# Patient Record
Sex: FEMALE | Race: WHITE | NOT HISPANIC OR LATINO | Employment: OTHER | URBAN - METROPOLITAN AREA
[De-identification: names, ages, dates, MRNs, and addresses within clinical notes are randomized per-mention and may not be internally consistent; named-entity substitution may affect disease eponyms.]

---

## 2017-02-20 ENCOUNTER — GENERIC CONVERSION - ENCOUNTER (OUTPATIENT)
Dept: OTHER | Facility: OTHER | Age: 67
End: 2017-02-20

## 2017-04-02 ENCOUNTER — ANESTHESIA EVENT (OUTPATIENT)
Dept: PERIOP | Facility: HOSPITAL | Age: 67
End: 2017-04-02
Payer: MEDICARE

## 2017-04-03 ENCOUNTER — ANESTHESIA (OUTPATIENT)
Dept: PERIOP | Facility: HOSPITAL | Age: 67
End: 2017-04-03
Payer: MEDICARE

## 2017-04-03 ENCOUNTER — GENERIC CONVERSION - ENCOUNTER (OUTPATIENT)
Dept: OTHER | Facility: OTHER | Age: 67
End: 2017-04-03

## 2017-04-03 ENCOUNTER — HOSPITAL ENCOUNTER (OUTPATIENT)
Facility: HOSPITAL | Age: 67
Setting detail: OUTPATIENT SURGERY
Discharge: HOME | End: 2017-04-03
Attending: INTERNAL MEDICINE | Admitting: INTERNAL MEDICINE
Payer: MEDICARE

## 2017-04-03 VITALS
DIASTOLIC BLOOD PRESSURE: 64 MMHG | BODY MASS INDEX: 17.14 KG/M2 | OXYGEN SATURATION: 99 % | WEIGHT: 85 LBS | TEMPERATURE: 97.1 F | HEIGHT: 59 IN | SYSTOLIC BLOOD PRESSURE: 118 MMHG | RESPIRATION RATE: 16 BRPM | HEART RATE: 78 BPM

## 2017-04-03 DIAGNOSIS — R63.4 WEIGHT LOSS: ICD-10-CM

## 2017-04-03 LAB
ATRIAL RATE: 60 BPM
P AXIS: 21 DEGREES
PR INTERVAL: 116 MS
QRS AXIS: 28 DEGREES
QRSD INTERVAL: 80 MS
QT INTERVAL: 434 MS
QTC INTERVAL: 434 MS
T WAVE AXIS: 36 DEGREES
VENTRICULAR RATE: 60 BPM

## 2017-04-03 PROCEDURE — 88342 IMHCHEM/IMCYTCHM 1ST ANTB: CPT | Performed by: INTERNAL MEDICINE

## 2017-04-03 PROCEDURE — 88305 TISSUE EXAM BY PATHOLOGIST: CPT | Performed by: INTERNAL MEDICINE

## 2017-04-03 PROCEDURE — 88341 IMHCHEM/IMCYTCHM EA ADD ANTB: CPT | Performed by: INTERNAL MEDICINE

## 2017-04-03 PROCEDURE — 93005 ELECTROCARDIOGRAM TRACING: CPT

## 2017-04-03 RX ORDER — SODIUM CHLORIDE, SODIUM LACTATE, POTASSIUM CHLORIDE, CALCIUM CHLORIDE 600; 310; 30; 20 MG/100ML; MG/100ML; MG/100ML; MG/100ML
INJECTION, SOLUTION INTRAVENOUS CONTINUOUS PRN
Status: DISCONTINUED | OUTPATIENT
Start: 2017-04-03 | End: 2017-04-03 | Stop reason: SURG

## 2017-04-03 RX ORDER — SODIUM CHLORIDE, SODIUM LACTATE, POTASSIUM CHLORIDE, CALCIUM CHLORIDE 600; 310; 30; 20 MG/100ML; MG/100ML; MG/100ML; MG/100ML
50 INJECTION, SOLUTION INTRAVENOUS CONTINUOUS
Status: DISCONTINUED | OUTPATIENT
Start: 2017-04-03 | End: 2017-04-03 | Stop reason: HOSPADM

## 2017-04-03 RX ORDER — PROPOFOL 10 MG/ML
INJECTION, EMULSION INTRAVENOUS AS NEEDED
Status: DISCONTINUED | OUTPATIENT
Start: 2017-04-03 | End: 2017-04-03 | Stop reason: SURG

## 2017-04-03 RX ORDER — LEVOTHYROXINE SODIUM 0.1 MG/1
100 TABLET ORAL DAILY
COMMUNITY

## 2017-04-03 RX ADMIN — PROPOFOL 100 MG: 10 INJECTION, EMULSION INTRAVENOUS at 10:45

## 2017-04-03 RX ADMIN — SODIUM CHLORIDE, SODIUM LACTATE, POTASSIUM CHLORIDE, AND CALCIUM CHLORIDE: .6; .31; .03; .02 INJECTION, SOLUTION INTRAVENOUS at 10:00

## 2017-04-10 ENCOUNTER — GENERIC CONVERSION - ENCOUNTER (OUTPATIENT)
Dept: OTHER | Facility: OTHER | Age: 67
End: 2017-04-10

## 2017-04-26 ENCOUNTER — GENERIC CONVERSION - ENCOUNTER (OUTPATIENT)
Dept: OTHER | Facility: OTHER | Age: 67
End: 2017-04-26

## 2017-05-11 ENCOUNTER — ALLSCRIPTS OFFICE VISIT (OUTPATIENT)
Dept: OTHER | Facility: OTHER | Age: 67
End: 2017-05-11

## 2018-01-09 NOTE — RESULT NOTES
Message   I forgot to mention she has calcium oxalate in the urine  does she have a history of kidney stones? would recommend CT to check for kidney stones     Verified Results  (1) URINALYSIS w URINE C/S REFLEX (will reflex a microscopy if leukocytes, occult blood, or nitrites are not within normal limits) 27Oct2016 11:06AM Buck Gregorio Order Number: [id number]     Test Name Result Flag Reference   COLOR Yellow     CLARITY Cloudy     PH UA 6 0  4 5-8 0   LEUKOCYTE ESTERASE UA Small A Negative   NITRITE UA Negative  Negative   PROTEIN UA Negative mg/dl  Negative   GLUCOSE UA Negative mg/dl  Negative   KETONES UA Trace mg/dl A Negative   UROBILINOGEN UA 1 0 E U /dl  0 2, 1 0 E U /dl   BILIRUBIN UA  A Negative   Interference- unable to analyze   The dipstick result may be falsely positive do to interfering substances  We recommend reliance upon serum bilirubin, liver & kidney function tests to guide patient care if clinically indicated     BLOOD UA Trace A Negative   SPECIFIC GRAVITY UA 1 026  1 003-1 030   BACTERIA Occasional /hpf  None Seen, Occasional   CALCIUM OXALATE CRYSTALS /HPF IN URINE SEDIMENT BY MICROSCOPY Moderate /hpf A None Seen   EPITHELIAL CELLS Occasional /hpf  None Seen, Occasional   RBC UA 2-4 /hpf A None Seen   WBC UA None Seen /hpf  None Seen   CLINICAL REPORT (Report)     Test:        Urine culture  Specimen Type:   Urine  Specimen Date:   10/27/2016 11:06 AM  Result Date:    10/28/2016 6:58 PM  Result Status:   Final result  Resulting Lab:    [facility]            Tel: [phone]      CULTURE                                       ------------------                                   70,000-79,000 cfu/ml Mixed Contaminants X5

## 2018-01-11 NOTE — MISCELLANEOUS
Provider Comments  Provider Comments:   1st no show for neurology - No call or message received prior to appointment  Tried to call patient but phone numbers in account has restrictions on the account and could not leave a message   Christos Munoz MD    Electronically signed by : Yvonne Apgar, MD; May 12 2017 12:53PM EST                       (Author)

## 2018-01-15 NOTE — RESULT NOTES
Message   thyroid levels are a bit high but i think okay for a post-thyroid cancer patient  Her Vitamin D is low slightly 29  She should take 1000 units per day of Vitamin D  otherwise labs okay     Verified Results  (1) TSH 27Oct2016 11:06AM Howard Mcwilliams   TW Order Number: [id number]     Test Name Result Flag Reference   TSH 0 234 uIU/mL L 0 358-3 740   - Patient Instructions: This bloodwork is non-fasting  Please drink two glasses of water morning of bloodwork  - Patient Instructions: This bloodwork is non-fasting  Please drink two glasses of water morning of bloodwork  Patients undergoing fluorescein dye angiography may retain small amounts of fluorescein in the body for 48-72 hours post procedure  Samples containing fluorescein can produce falsely depressed TSH values  If the patient had this procedure,a specimen should be resubmitted post fluorescein clearance  The recommended reference ranges for TSH during pregnancy are as follows:  First trimester 0 1 to 2 5 uIU/mL  Second trimester  0 2 to 3 0 uIU/mL  Third trimester 0 3 to 3 0 uIU/m     (1) T4, FREE 27Oct2016 11:06AM [facility] Order Number: [id number]     Test Name Result Flag Reference   T4,FREE 2 16 ng/dL H 0 76-1 46   - Patient Instructions: This bloodwork is non-fasting  Please drink two glasses of water morning of bloodwork  (1) T3 TOTAL 27Oct2016 11:06AM [facility] Order Number: [id number]     Test Name Result Flag Reference   T3 1 0 ng/mL  0 6-1 8     (1) CBC/PLT/DIFF 27Oct2016 11:06AM [facility] Order Number: [id number]     Test Name Result Flag Reference   WBC COUNT 7 60 Thousand/uL  4 31-10 16   RBC COUNT 4 62 Million/uL  3 81-5 12   HEMOGLOBIN 13 7 g/dL  11 5-15 4   HEMATOCRIT 42 1 %  34 8-46  1   MCV 91 fL  82-98   MCH 29 7 pg  26 8-34 3   MCHC 32 5 g/dL  31 4-37 4   RDW 14 2 %  11 6-15 1   MPV 11 7 fL  8 9-12 7   PLATELET COUNT 974 Thousands/uL  149-390 nRBC AUTOMATED 0 /100 WBCs     NEUTROPHILS RELATIVE PERCENT 70 %  43-75   LYMPHOCYTES RELATIVE PERCENT 23 %  14-44   MONOCYTES RELATIVE PERCENT 6 %  4-12   EOSINOPHILS RELATIVE PERCENT 1 %  0-6   BASOPHILS RELATIVE PERCENT 0 %  0-1   NEUTROPHILS ABSOLUTE COUNT 5 27 Thousands/?L  1 85-7 62   LYMPHOCYTES ABSOLUTE COUNT 1 78 Thousands/?L  0 60-4 47   MONOCYTES ABSOLUTE COUNT 0 44 Thousand/?L  0 17-1 22   EOSINOPHILS ABSOLUTE COUNT 0 07 Thousand/?L  0 00-0 61   BASOPHILS ABSOLUTE COUNT 0 03 Thousands/?L  0 00-0 10   - Patient Instructions: This bloodwork is non-fasting  Please drink two glasses of water morning of bloodwork  - Patient Instructions: This bloodwork is non-fasting  Please drink two glasses of water morning of bloodwork  (1) COMPREHENSIVE METABOLIC PANEL 56THW1952 95:84XY [facility] [place] Order Number: [id number]     Test Name Result Flag Reference   GLUCOSE,RANDM 86 mg/dL     If the patient is fasting, the ADA then defines impaired fasting glucose as > 100 mg/dL and diabetes as > or equal to 123 mg/dL  SODIUM 140 mmol/L  136-145   POTASSIUM 3 7 mmol/L  3 5-5 3   CHLORIDE 105 mmol/L  100-108   CARBON DIOXIDE 28 mmol/L  21-32   ANION GAP (CALC) 7 mmol/L  4-13   BLOOD UREA NITROGEN 16 mg/dL  5-25   CREATININE 0 66 mg/dL  0 60-1 30   Standardized to IDMS reference method   CALCIUM 9 1 mg/dL  8 3-10 1   BILI, TOTAL 0 68 mg/dL  0 20-1 00   ALK PHOSPHATAS 56 U/L     ALT (SGPT) 15 U/L  12-78   AST(SGOT) 11 U/L  5-45   ALBUMIN 4 4 g/dL  3 5-5 0   TOTAL PROTEIN 8 5 g/dL H 6 4-8 2   eGFR Non-African American      >60 0 ml/min/1 73sq m   - Patient Instructions: This bloodwork is non-fasting  Please drink two glasses of water morning of bloodwork    National Kidney Disease Education Program recommendations are as follows:  GFR calculation is accurate only with a steady state creatinine  Chronic Kidney disease less than 60 ml/min/1 73 sq  meters  Kidney failure less than 15 ml/min/1 73 sq  meters  (1) VITAMIN B12 27Oct2016 11:06AM "[employer]" Order Number: [id number]     Test Name Result Flag Reference   VITAMIN B12 571 pg/mL  100-900   - Patient Instructions: This bloodwork is non-fasting  Please drink two glasses of water morning of bloodwork  (1) MAGNESIUM 27Oct2016 11:06AM [facility] [email or web address] Order Number: [id number]     Test Name Result Flag Reference   MAGNESIUM 2 6 mg/dL  1 6-2 6   - Patient Instructions: This bloodwork is non-fasting  Please drink two glasses of water morning of bloodwork  (1) FOLATE 27Oct2016 11:06AM "[employer]" Order Number: [id number]     Test Name Result Flag Reference   FOLATE 14 1 ng/mL  3 1-17 5   - Patient Instructions: This bloodwork is non-fasting  Please drink two glasses of water morning of bloodwork  (1) URINALYSIS w URINE C/S REFLEX (will reflex a microscopy if leukocytes, occult blood, or nitrites are not within normal limits) 27Oct2016 11:06AM "[employer]" Order Number: [id number]     Test Name Result Flag Reference   COLOR Yellow     CLARITY Cloudy     PH UA 6 0  4 5-8 0   LEUKOCYTE ESTERASE UA Small A Negative   NITRITE UA Negative  Negative   PROTEIN UA Negative mg/dl  Negative   GLUCOSE UA Negative mg/dl  Negative   KETONES UA Trace mg/dl A Negative   UROBILINOGEN UA 1 0 E U /dl  0 2, 1 0 E U /dl   BILIRUBIN UA  A Negative   Interference- unable to analyze   The dipstick result may be falsely positive do to interfering substances  We recommend reliance upon serum bilirubin, liver & kidney function tests to guide patient care if clinically indicated     BLOOD UA Trace A Negative   SPECIFIC GRAVITY UA 1 026  1 003-1 030   BACTERIA Occasional /hpf  None Seen, Occasional   CALCIUM OXALATE CRYSTALS /HPF IN URINE SEDIMENT BY MICROSCOPY Moderate /hpf A None Seen   EPITHELIAL CELLS Occasional /hpf  None Seen, Occasional   RBC UA 2-4 /hpf A None Seen   WBC UA None Seen /hpf  None Seen (1) THYROGLOBULIN/QUANT W/ANTIBODY PANEL 27Oct2016 11:06AM Constanza Floresstephen Order Number: [id number]     Test Name Result Flag Reference   THYROGLOB AB <1 0 IU/mL  0 0 - 0 9   Thyroglobulin Antibody measured by [facility] Methodology    Performed at:  [address] [place] [address]  [id number]  : Rosendo Ybarra MD, Phone:  [phone]   THYROGLOBULIN-ESTEFANI <0 1 ng/mL L 1 5 - 38 5   According to the [facility] of Clinical Biochemistry, the  reference interval for Thyroglobulin (TG) should be related to  euthyroid patients and not for patients who underwent thyroidectomy  TG reference intervals for these patients depend on the residual  mass of the thyroid tissue left after surgery  Establishing a  post-operative baseline is recommended  The assay limit of quantitation is 0 1 ng/mL  Thyroglobulin measured by [facility] Immunometric Assay    Performed at:  [address] [place] [address]  [id number]  : Rosendo Ybarra MD, Phone:  [phone]     (1) CALCIUM IONIZED 27Oct2016 11:06AM Hanhcarmen Cranker Order Number: [id number]     Test Name Result Flag Reference   CALCIUM IONIZED 1 17 mmol/L  1 12-1 32     (1) VITAMIN D 25-HYDROXY 27Oct2016 11:06AM Windy Cranker Order Number: [id number]     Test Name Result Flag Reference   VIT D 25-HYDROX 29 5 ng/mL L 30 0-100 0   This assay is a certified procedure of the CDC Vitamin D Standardization Certification Program (VDSCP)     Deficiency <20ng/ml   Insufficiency 20-30ng/ml   Sufficient  ng/ml     *Patients undergoing fluorescein dye angiography may retain small amounts of fluorescein in the body for 48-72 hours post procedure  Samples containing fluorescein can produce falsely elevated Vitamin D values  If the patient had this procedure, a specimen should be resubmitted post fluorescein clearance       (1) PTH N-TERMINAL (INTACT) 27Oct2016 11:06AM [facility]

## 2018-01-17 NOTE — RESULT NOTES
Verified Results  (1) TISSUE EXAM 03Apr2017 10:45AM Monisha Green     Test Name Result Flag Reference   LAB AP CASE REPORT (Report)     Surgical Pathology Report             Case: [id number]                   Authorizing Provider: Jayjay Chapa MD  Collected:      04/03/2017 1045        Ordering Location:   [address] [place] [address] Received:      04/03/2017 1239                    Operating Room                                 Pathologist:      Caitlyn Hassan DO                               Specimens:  A) - Duodenum                                             B) - Stomach   LAB AP FINAL DIAGNOSIS (Report)     A  Duodenum, biopsy:  - Duodenal mucosa with no significant pathologic abnormalities  - No villous atrophy or increased intraepithelial lymphocytes identified  B  Stomach, biopsy:  - Chronic inactive antral and oxyntic gastritis  - No H  pylori organisms identified on H&E and immunohistochemical stains  Appropriate positive and negative controls obtained  Interpretation performed at [facility], [address] [place] [address]  Electronically signed by Caitlyn Hassan DO on 4/5/2017 at 1:18 PM   LAB AP NOTE      Part B: Additional immunostain for pancytokeratin AE1/3 is underway and   the results will be issued in an addendum  LAB AP SURGICAL ADDITIONAL INFORMATION (Report)     These tests were developed and their performance characteristics   determined by [facility] or [facility]  They may not be cleared or approved by the [place]  Food and   Drug Administration  The FDA has determined that such clearance or   approval is not necessary  These tests are used for clinical purposes  They should not be regarded as investigational or for research  This   laboratory has been approved by CLIA 88, designated as a high-complexity   laboratory and is qualified to perform these tests  LAB AP GROSS DESCRIPTION (Report)     A   The specimen is received in formalin, labeled with the patient's name   and hospital number, and is designated duodenum biopsy rule out celiac   sprue  The specimen consists of a tan soft tissue fragment measuring 0 6   cm in greatest dimension  Entirely submitted  One cassette  B  The specimen is received in formalin, labeled with the patient's name   and hospital number, and is designated Stomach biopsy rule out H   pylori  The specimen consists of 4 tan soft tissue fragments ranging in   size from 0 4-0 7 cm in greatest dimension  Entirely submitted  One   cassette  Note: The estimated total formalin fixation time based upon information   provided by the submitting clinician and the standard processing schedule   is 27 25 hours  [address]   LAB AP CLINICAL INFORMATION Cold bx r/o c  sprue     Cold bx r/o c  sprue   LAB AP ADDENDUM 1      Part B: Immunostain for pancytokeratin AE1/3 is negative for aberrant   expression    Addendum electronically signed by Domingo Garcia DO on 4/6/2017 at 11:04 AM

## 2020-01-15 ENCOUNTER — TRANSCRIBE ORDERS (OUTPATIENT)
Dept: MRI IMAGING | Facility: CLINIC | Age: 70
End: 2020-01-15

## 2020-01-15 DIAGNOSIS — G20 PARKINSON DISEASE (HCC): ICD-10-CM

## 2020-01-15 DIAGNOSIS — C72.0 MALIGNANT NEOPLASM OF SPINAL CORD (HCC): Primary | ICD-10-CM
